# Patient Record
Sex: FEMALE | Race: WHITE | NOT HISPANIC OR LATINO | Employment: FULL TIME | URBAN - NONMETROPOLITAN AREA
[De-identification: names, ages, dates, MRNs, and addresses within clinical notes are randomized per-mention and may not be internally consistent; named-entity substitution may affect disease eponyms.]

---

## 2020-11-10 ENCOUNTER — OFFICE VISIT (OUTPATIENT)
Dept: OTOLARYNGOLOGY | Facility: OTHER | Age: 30
End: 2020-11-10
Attending: OTOLARYNGOLOGY
Payer: COMMERCIAL

## 2020-11-10 DIAGNOSIS — K21.9 LPRD (LARYNGOPHARYNGEAL REFLUX DISEASE): Primary | ICD-10-CM

## 2020-11-10 PROCEDURE — G0463 HOSPITAL OUTPT CLINIC VISIT: HCPCS

## 2020-11-17 NOTE — PROGRESS NOTES
document embedded image  Patient Name: Luzma Hung    Address: [address] [place] [address]     YOB: 1990    [place] [address]    MR Number: [id number]    Phone: [phone]  PCP: Cinthia Toribio PA-C            Appointment Date: 11/10/20   Visit Provider: Hesham Diallo MD    cc: Cinthia Toribio PA-C; ~    ENT Progress Note    Visit Reasons: Acute pharyngitis    HPI  History of Present Illness  Chief complaint:  Chronic hypopharyngeal irritation    History  The patient is a 30-year-old female who comes to the office today with a 6 week history fluctuating hypopharyngeal discomfort.  She has also noted a tightness in her hypopharynx.  She denies actual dysphagia, odynophagia, hemoptysis, hematemesis, voice change.  She denies ear pain.  She has no cristal GERD.  She smoked briefly but quit for 5 years ago.    Exam:  Oral cavity oropharynx-her tonsils are present but uninflamed.  There is no mucosal lesions.  She has little bit of cobblestoning of her posterior oropharyngeal wall.  Nasal-no obstruction or purulence  Neck-no palpable masses or adenopathy  Head and neck integument-Clear  Indirect laryngoscopy-she does have some substantial posterior laryngeal edema and erythema consistent with reflux.  I see no evidence of neoplasm or neurologic deficit.  General-the patient appears well and in no distress  Neuro-there are no focal cranial nerve deficits    Home Medications     - Last Reconciled 11/11/20 by Nohemi Menchaca CMA    bupropion HCl 150 mg tablet,12 hr sustained-release (Wellbutrin SR)  150 mg PO BID  cholecalciferol (vitamin D3) 125 mcg (5,000 unit) capsule  125 mcg PO DAILY  fluconazole 150 mg tablet (Diflucan)  150 mg PO Q3D 2 doses  fluticasone propionate 50 mcg/actuation nasal spray,suspension  1 spray intranasal DAILY 2 weeks  levothyroxine 100 mcg capsule  100 mcg PO DAILY 90 days  lidocaine HCl 2 % mucosal solution (Lidocaine Viscous)  10 mL mucous membrane Q4H  "PRN  norgestimate-ethinyl estradiol 0.18 mg/0.215mg/0.25mg-35 mcg(28)tablet (Tri-Linyah)  1 tab PO DAILY  omeprazole 20 mg capsule,delayed release  20 mg PO DAILY  ondansetron 4 mg disintegrating tablet  4 mg PO Q8H 5 days PRN  promethazine 25 mg tablet  25 mg PO TID PRN    Allergies    cherry flavor Allergy (Mild, Verified 20 10:18)  Vomiting    PFSH  PFSH:     Medical History (Reviewed 20 @ 10:19 by Nohemi Menchaca [facility])    Asthma  Back strain  Chickenpox  History of ADHD  Hx of major depression  Primary hypothyroidism     Family History (Reviewed 20 @ 10:19 by Nohemi Menchaca CMA)  Father  Alive and well  Mother  Alive and well  Family/Other  Diagnosis unknown       5 brother(s) , 4 sister(s) .       Adopted           Social History (Reviewed 20 @ 10:19 by Nohemi Menchaca [facility])  Smoking Status:  Former smoker  how long ago did patient quit smokin-5 years  second hand exposure:  Yes (exposed to 2nd hand smoke)  alcohol intake:  current  substance use type:  does not use  marital status:  Single  current occupation:  CNA  Current Employer::  [employer] in [place]  Do you exercise regularly?:  No  OB History:  Pregnancy # 1: 18 , normal spontaneous vaginal delivery () full term male 6 lbs 10 oz, \"Castiel\"            GYN History:  Periods irregular  2018    Sexual activity not currently sexually active, with men 2018    Date of Last Period : 2018               Female Reproductive History:   ---------------------------------------------    Female Reproductive History    Age of Menarche: 18  Birth control method: pills  Pregnancy    : 1  Para: 1  Hx # Term Pregnancies: 1  Hx #  Pregnancies: 0  Elective abortions: 0  Spontaneous abortions: 0  Ectopic pregnancies: 0  Number of Living Children: 1    A&P  Assessment & Plan  (1) Laryngopharyngeal reflux:        Status: Acute        Code(s):  K21.9 - Gastro-esophageal reflux disease without " esophagitis  Additional Plan Details  Plan Details: Patient's symptoms and physical findings seem most consistent with laryngopharyngeal reflux.  We will start her on a 3 month trial of omeprazole.  She was encouraged to return at the conclusion if she has any lingering symptoms.  Departure  Other Medications:        New:    omeprazole 20 mg PO DAILY 90 caps 0RF            Hesham Diallo MD    11/10/20 0802    <Electronically signed by Hesham Diallo MD> 11/11/20 1237